# Patient Record
Sex: MALE | Race: WHITE | NOT HISPANIC OR LATINO | Employment: FULL TIME | URBAN - METROPOLITAN AREA
[De-identification: names, ages, dates, MRNs, and addresses within clinical notes are randomized per-mention and may not be internally consistent; named-entity substitution may affect disease eponyms.]

---

## 2018-10-31 ENCOUNTER — OFFICE VISIT (OUTPATIENT)
Dept: CARDIOLOGY | Facility: CLINIC | Age: 43
End: 2018-10-31

## 2018-10-31 VITALS
WEIGHT: 315 LBS | RESPIRATION RATE: 16 BRPM | HEIGHT: 69 IN | DIASTOLIC BLOOD PRESSURE: 72 MMHG | SYSTOLIC BLOOD PRESSURE: 102 MMHG | BODY MASS INDEX: 46.65 KG/M2 | HEART RATE: 83 BPM

## 2018-10-31 DIAGNOSIS — R94.31 ABNORMAL EKG: ICD-10-CM

## 2018-10-31 DIAGNOSIS — R07.2 PRECORDIAL PAIN: ICD-10-CM

## 2018-10-31 DIAGNOSIS — R00.2 PALPITATIONS: Primary | ICD-10-CM

## 2018-10-31 DIAGNOSIS — G47.33 OBSTRUCTIVE SLEEP APNEA: ICD-10-CM

## 2018-10-31 DIAGNOSIS — E66.01 CLASS 3 SEVERE OBESITY DUE TO EXCESS CALORIES WITHOUT SERIOUS COMORBIDITY WITH BODY MASS INDEX (BMI) OF 45.0 TO 49.9 IN ADULT (HCC): ICD-10-CM

## 2018-10-31 PROCEDURE — 99204 OFFICE O/P NEW MOD 45 MIN: CPT | Performed by: INTERNAL MEDICINE

## 2018-10-31 PROCEDURE — 93000 ELECTROCARDIOGRAM COMPLETE: CPT | Performed by: INTERNAL MEDICINE

## 2018-10-31 RX ORDER — BUSPIRONE HYDROCHLORIDE 5 MG/1
15 TABLET ORAL 2 TIMES DAILY
COMMUNITY
Start: 2018-10-03

## 2018-10-31 RX ORDER — ASPIRIN 325 MG
325 TABLET ORAL DAILY
COMMUNITY

## 2018-11-20 ENCOUNTER — HOSPITAL ENCOUNTER (OUTPATIENT)
Dept: CARDIOLOGY | Facility: HOSPITAL | Age: 43
Discharge: HOME | End: 2018-11-20
Attending: INTERNAL MEDICINE | Admitting: INTERNAL MEDICINE

## 2018-11-20 ENCOUNTER — TELEPHONE (OUTPATIENT)
Dept: CARDIOLOGY | Facility: CLINIC | Age: 43
End: 2018-11-20

## 2018-11-20 VITALS
HEIGHT: 69 IN | BODY MASS INDEX: 46.65 KG/M2 | HEART RATE: 78 BPM | SYSTOLIC BLOOD PRESSURE: 118 MMHG | WEIGHT: 315 LBS | DIASTOLIC BLOOD PRESSURE: 74 MMHG

## 2018-11-20 DIAGNOSIS — R00.2 PALPITATIONS: ICD-10-CM

## 2018-11-20 DIAGNOSIS — R07.2 PRECORDIAL PAIN: ICD-10-CM

## 2018-11-20 LAB
BH CV ECHO MEAS - ACS: 2.4 CM
BH CV ECHO MEAS - AO MAX PG (FULL): 2.8 MMHG
BH CV ECHO MEAS - AO MAX PG: 6.1 MMHG
BH CV ECHO MEAS - AO MEAN PG (FULL): 1.4 MMHG
BH CV ECHO MEAS - AO MEAN PG: 3 MMHG
BH CV ECHO MEAS - AO ROOT AREA (BSA CORRECTED): 1.5
BH CV ECHO MEAS - AO ROOT AREA: 10.9 CM^2
BH CV ECHO MEAS - AO ROOT DIAM: 3.7 CM
BH CV ECHO MEAS - AO V2 MAX: 123.5 CM/SEC
BH CV ECHO MEAS - AO V2 MEAN: 75.2 CM/SEC
BH CV ECHO MEAS - AO V2 VTI: 22 CM
BH CV ECHO MEAS - AVA(I,A): 2.9 CM^2
BH CV ECHO MEAS - AVA(I,D): 2.9 CM^2
BH CV ECHO MEAS - AVA(V,A): 2.6 CM^2
BH CV ECHO MEAS - AVA(V,D): 2.6 CM^2
BH CV ECHO MEAS - BSA(HAYCOCK): 2.7 M^2
BH CV ECHO MEAS - BSA: 2.5 M^2
BH CV ECHO MEAS - BZI_BMI: 47.7 KILOGRAMS/M^2
BH CV ECHO MEAS - BZI_METRIC_HEIGHT: 175.3 CM
BH CV ECHO MEAS - BZI_METRIC_WEIGHT: 146.5 KG
BH CV ECHO MEAS - EDV(MOD-SP2): 88 ML
BH CV ECHO MEAS - EDV(MOD-SP4): 119 ML
BH CV ECHO MEAS - EDV(TEICH): 173.2 ML
BH CV ECHO MEAS - EF(CUBED): 70.7 %
BH CV ECHO MEAS - EF(MOD-BP): 56 %
BH CV ECHO MEAS - EF(MOD-SP2): 52.3 %
BH CV ECHO MEAS - EF(MOD-SP4): 58.8 %
BH CV ECHO MEAS - EF(TEICH): 61.5 %
BH CV ECHO MEAS - ESV(MOD-SP2): 42 ML
BH CV ECHO MEAS - ESV(MOD-SP4): 49 ML
BH CV ECHO MEAS - ESV(TEICH): 66.6 ML
BH CV ECHO MEAS - FS: 33.6 %
BH CV ECHO MEAS - IVS/LVPW: 0.92
BH CV ECHO MEAS - IVSD: 1.1 CM
BH CV ECHO MEAS - LAT PEAK E' VEL: 10 CM/SEC
BH CV ECHO MEAS - LV DIASTOLIC VOL/BSA (35-75): 47 ML/M^2
BH CV ECHO MEAS - LV MASS(C)D: 289.2 GRAMS
BH CV ECHO MEAS - LV MASS(C)DI: 114.2 GRAMS/M^2
BH CV ECHO MEAS - LV MAX PG: 3.3 MMHG
BH CV ECHO MEAS - LV MEAN PG: 1.6 MMHG
BH CV ECHO MEAS - LV SYSTOLIC VOL/BSA (12-30): 19.3 ML/M^2
BH CV ECHO MEAS - LV V1 MAX: 90.2 CM/SEC
BH CV ECHO MEAS - LV V1 MEAN: 57.1 CM/SEC
BH CV ECHO MEAS - LV V1 VTI: 18.3 CM
BH CV ECHO MEAS - LVIDD: 5.9 CM
BH CV ECHO MEAS - LVIDS: 3.9 CM
BH CV ECHO MEAS - LVLD AP2: 8 CM
BH CV ECHO MEAS - LVLD AP4: 7.6 CM
BH CV ECHO MEAS - LVLS AP2: 7 CM
BH CV ECHO MEAS - LVLS AP4: 7 CM
BH CV ECHO MEAS - LVOT AREA (M): 3.5 CM^2
BH CV ECHO MEAS - LVOT AREA: 3.5 CM^2
BH CV ECHO MEAS - LVOT DIAM: 2.1 CM
BH CV ECHO MEAS - LVPWD: 1.2 CM
BH CV ECHO MEAS - MED PEAK E' VEL: 8 CM/SEC
BH CV ECHO MEAS - MV A DUR: 0.11 SEC
BH CV ECHO MEAS - MV A MAX VEL: 73.2 CM/SEC
BH CV ECHO MEAS - MV DEC SLOPE: 258.3 CM/SEC^2
BH CV ECHO MEAS - MV DEC TIME: 0.22 SEC
BH CV ECHO MEAS - MV E MAX VEL: 50.9 CM/SEC
BH CV ECHO MEAS - MV E/A: 0.69
BH CV ECHO MEAS - MV MAX PG: 3.4 MMHG
BH CV ECHO MEAS - MV MEAN PG: 1.7 MMHG
BH CV ECHO MEAS - MV P1/2T MAX VEL: 53.4 CM/SEC
BH CV ECHO MEAS - MV P1/2T: 60.5 MSEC
BH CV ECHO MEAS - MV V2 MAX: 92.3 CM/SEC
BH CV ECHO MEAS - MV V2 MEAN: 61.3 CM/SEC
BH CV ECHO MEAS - MV V2 VTI: 17.5 CM
BH CV ECHO MEAS - MVA P1/2T LCG: 4.1 CM^2
BH CV ECHO MEAS - MVA(P1/2T): 3.6 CM^2
BH CV ECHO MEAS - MVA(VTI): 3.7 CM^2
BH CV ECHO MEAS - PA MAX PG (FULL): 2.7 MMHG
BH CV ECHO MEAS - PA MAX PG: 6.7 MMHG
BH CV ECHO MEAS - PA V2 MAX: 129 CM/SEC
BH CV ECHO MEAS - PULM A REVS DUR: 0.1 SEC
BH CV ECHO MEAS - PULM A REVS VEL: 27.5 CM/SEC
BH CV ECHO MEAS - PULM DIAS VEL: 30.9 CM/SEC
BH CV ECHO MEAS - PULM S/D: 1.3
BH CV ECHO MEAS - PULM SYS VEL: 40.1 CM/SEC
BH CV ECHO MEAS - PVA(V,A): 1.6 CM^2
BH CV ECHO MEAS - PVA(V,D): 1.6 CM^2
BH CV ECHO MEAS - QP/QS: 0.64
BH CV ECHO MEAS - RV MAX PG: 4 MMHG
BH CV ECHO MEAS - RV MEAN PG: 2.5 MMHG
BH CV ECHO MEAS - RV V1 MAX: 99.4 CM/SEC
BH CV ECHO MEAS - RV V1 MEAN: 73 CM/SEC
BH CV ECHO MEAS - RV V1 VTI: 19.5 CM
BH CV ECHO MEAS - RVOT AREA: 2.1 CM^2
BH CV ECHO MEAS - RVOT DIAM: 1.6 CM
BH CV ECHO MEAS - SI(AO): 95.1 ML/M^2
BH CV ECHO MEAS - SI(CUBED): 57.3 ML/M^2
BH CV ECHO MEAS - SI(LVOT): 25.5 ML/M^2
BH CV ECHO MEAS - SI(MOD-SP2): 18.2 ML/M^2
BH CV ECHO MEAS - SI(MOD-SP4): 27.6 ML/M^2
BH CV ECHO MEAS - SI(TEICH): 42.1 ML/M^2
BH CV ECHO MEAS - SV(AO): 240.7 ML
BH CV ECHO MEAS - SV(CUBED): 145.2 ML
BH CV ECHO MEAS - SV(LVOT): 64.6 ML
BH CV ECHO MEAS - SV(MOD-SP2): 46 ML
BH CV ECHO MEAS - SV(MOD-SP4): 70 ML
BH CV ECHO MEAS - SV(RVOT): 41.4 ML
BH CV ECHO MEAS - SV(TEICH): 106.6 ML
BH CV ECHO MEAS - TAPSE (>1.6): 3 CM2
BH CV ECHO MEASUREMENTS AVERAGE E/E' RATIO: 5.66
BH CV XLRA - RV BASE: 3.1 CM
BH CV XLRA - TDI S': 13 CM/SEC
LEFT ATRIUM VOLUME INDEX: 14 ML/M2
MAXIMAL PREDICTED HEART RATE: 175 BPM
STRESS TARGET HR: 149 BPM

## 2018-11-20 PROCEDURE — 93306 TTE W/DOPPLER COMPLETE: CPT | Performed by: INTERNAL MEDICINE

## 2018-11-20 PROCEDURE — 93306 TTE W/DOPPLER COMPLETE: CPT

## 2018-11-20 PROCEDURE — 25010000002 PERFLUTREN (DEFINITY) 8.476 MG IN SODIUM CHLORIDE 0.9 % 10 ML INJECTION: Performed by: INTERNAL MEDICINE

## 2018-11-20 RX ADMIN — PERFLUTREN 1.5 ML: 6.52 INJECTION, SUSPENSION INTRAVENOUS at 16:05

## 2021-02-05 ENCOUNTER — TELEPHONE (OUTPATIENT)
Dept: NEUROLOGY | Facility: CLINIC | Age: 46
End: 2021-02-05

## 2021-02-05 NOTE — TELEPHONE ENCOUNTER
Patient called in stating his appointment on 02/08/2021 at 8:40am is way to early of an appointment. Tosha from the [facility] transferred the call to the office due to the patient requesting to go back to [place]. Unfortunately due to the patient having an abnormal CT, [place] feels it is best for the patient to see Dr. Jaramillo. I explained this situation to the patient however he stated that family brings him to the appointments and it is very inconvenient for them to have to get up that early as he lived an hour and half away from them. I apologized to the patient however he got very rude and stated he is very not happy with this result and he doesn't understand what the abnormal CT has to do with who sees him if they are all Neurologists. Please advise, Ana María handled the call very professionally and tried to calm the patient down, but he is very upset with whom he has to see and how early as well as the fact that the next appointment available is not until March and after that not until May.

## 2021-02-08 ENCOUNTER — OFFICE VISIT (OUTPATIENT)
Dept: NEUROLOGY | Facility: CLINIC | Age: 46
End: 2021-02-08

## 2021-02-08 VITALS
HEART RATE: 89 BPM | BODY MASS INDEX: 46.65 KG/M2 | HEIGHT: 69 IN | DIASTOLIC BLOOD PRESSURE: 84 MMHG | OXYGEN SATURATION: 98 % | SYSTOLIC BLOOD PRESSURE: 134 MMHG | WEIGHT: 315 LBS

## 2021-02-08 DIAGNOSIS — R55 NEAR SYNCOPE: ICD-10-CM

## 2021-02-08 DIAGNOSIS — R42 LIGHTHEADEDNESS: Primary | ICD-10-CM

## 2021-02-08 PROCEDURE — 99204 OFFICE O/P NEW MOD 45 MIN: CPT | Performed by: PSYCHIATRY & NEUROLOGY

## 2021-02-08 RX ORDER — ASPIRIN 81 MG/1
81 TABLET ORAL DAILY
COMMUNITY

## 2021-02-08 RX ORDER — ATORVASTATIN CALCIUM 40 MG/1
40 TABLET, FILM COATED ORAL
COMMUNITY
Start: 2021-01-06

## 2021-02-08 RX ORDER — HYDROXYZINE PAMOATE 25 MG/1
CAPSULE ORAL
COMMUNITY
Start: 2021-01-05

## 2021-02-26 ENCOUNTER — TELEPHONE (OUTPATIENT)
Dept: NEUROLOGY | Facility: CLINIC | Age: 46
End: 2021-02-26

## 2021-02-26 NOTE — TELEPHONE ENCOUNTER
Dr. Jaramillo placed Tilt table Test. Do we need to send to his Cardiologist to have done there, or for her to order it?

## 2021-02-26 NOTE — TELEPHONE ENCOUNTER
Provider:       Caller: PT    Relationship to Patient: SELF    Pharmacy: NA    Phone Number: [phone]    Reason for Call: PATIENT STATES HE IS CONFUSED ABOUT TABLE TILT REFERRAL. WAS THINKING THIS SHOULD BY HIS [facility] CARDIOLOGIST. HER NAME IS DR.LAUREN SHAH. PLEASE ADVISE.     When was the patient last seen: 2-8-21

## 2021-04-02 ENCOUNTER — BULK ORDERING (OUTPATIENT)
Dept: CASE MANAGEMENT | Facility: OTHER | Age: 46
End: 2021-04-02

## 2021-04-02 DIAGNOSIS — Z23 IMMUNIZATION DUE: ICD-10-CM

## 2022-02-27 NOTE — TELEPHONE ENCOUNTER
I left him a message that the Holter monitor showed only premature ventricular contractions and reassured him that these are benign that we do have medications we can use if they were causing him a lot of symptoms.  Reminded him that the echo was normal.  Told him to call me back if he has any change in his symptoms.  
Info to Pt, expressed understanding  
Let him know that overall his echo looks good and that I will call him when I have the results of his monitor and we will go over everything in detail.  
No